# Patient Record
Sex: MALE | Race: WHITE | NOT HISPANIC OR LATINO | Employment: OTHER | URBAN - NONMETROPOLITAN AREA
[De-identification: names, ages, dates, MRNs, and addresses within clinical notes are randomized per-mention and may not be internally consistent; named-entity substitution may affect disease eponyms.]

---

## 2017-02-09 ENCOUNTER — OFFICE VISIT (OUTPATIENT)
Dept: CARDIOLOGY | Facility: CLINIC | Age: 82
End: 2017-02-09

## 2017-02-09 VITALS
SYSTOLIC BLOOD PRESSURE: 120 MMHG | HEIGHT: 70 IN | DIASTOLIC BLOOD PRESSURE: 64 MMHG | WEIGHT: 179 LBS | BODY MASS INDEX: 25.62 KG/M2 | HEART RATE: 60 BPM

## 2017-02-09 DIAGNOSIS — R06.02 SHORTNESS OF BREATH: ICD-10-CM

## 2017-02-09 DIAGNOSIS — R60.0 LOCALIZED EDEMA: ICD-10-CM

## 2017-02-09 DIAGNOSIS — I35.0 NONRHEUMATIC AORTIC VALVE STENOSIS: ICD-10-CM

## 2017-02-09 DIAGNOSIS — I49.5 SSS (SICK SINUS SYNDROME) (HCC): Primary | ICD-10-CM

## 2017-02-09 DIAGNOSIS — I10 ESSENTIAL HYPERTENSION: ICD-10-CM

## 2017-02-09 DIAGNOSIS — R53.83 OTHER FATIGUE: ICD-10-CM

## 2017-02-09 PROBLEM — G20 PARKINSON'S DISEASE (HCC): Status: ACTIVE | Noted: 2017-02-09

## 2017-02-09 PROBLEM — F03.90 DEMENTIA (HCC): Status: ACTIVE | Noted: 2017-02-09

## 2017-02-09 PROCEDURE — 99214 OFFICE O/P EST MOD 30 MIN: CPT | Performed by: NURSE PRACTITIONER

## 2017-02-09 PROCEDURE — 93000 ELECTROCARDIOGRAM COMPLETE: CPT | Performed by: NURSE PRACTITIONER

## 2017-02-09 NOTE — PROGRESS NOTES
Chief Complaint   Patient presents with   • Follow-up     Last pacemaker check 10/19/16. He has been having chest discomfort that he relates to indigestion.   • Dizziness     He states comes and goes. He states legs are weak.   • Shortness of Breath     He states this is new for him. PCP writes refills on medication. Last labs about 7 months ago.       Cardiac Complaints  dyspnea, Dizziness and fatigue      Subjective   Parth Tanner is a 84 y.o. male with a history of sick sinus syndrome diagnosed in 2011 by Dr. Monsivais and had a pacemaker placed. He also had a stress test and echocardiogram gram done the same year and reported as normal. In 2015 pacemaker interrogation showed short runs of ventricular tachycardia for which Lopressor was added.  Most recent pacer check in October of 2016 showed 99% ventricular pacing and less than 1% mode switch.  He returns today for follow up and reports shortness of breath with exertion and gets very winded with any activity.  He also reports dizziness which comes and goes and fatigue associated with it. He also reports some leg weakness and cold extremities, he says Dr. Blancas will be doing work up. Patient reports that PCP manages all labs and refills.        Cardiac History  Past Surgical History   Procedure Laterality Date   • Appendectomy     • Tonsillectomy     • Hand surgery Left    • Prostate surgery  08/2014     Prostate surgery-s/p recently dx with CA (to weak for surgery)-Dr.Ruby moya and .   • Pacemaker implantation  11/10/2011     St.Koko PPM placed by    • Cardiovascular stress test  12/09/2011     L.Myoview-() Normal   • Echo - converted  12/14/2011     Echo-() EF>65%   • Carotid artery - subclavian artery bypass graft  07/11/2014     Carotid US-() Normal   • Echo - converted  07/22/2015     Echo-EF 60%, AR 3.9, aortic sclerosis with minimal stenosis       Current Outpatient Prescriptions   Medication Sig Dispense Refill    • aspirin 81 MG EC tablet Take 81 mg by mouth daily.     • Cyanocobalamin (VITAMIN B-12) 1000 MCG/15ML liquid Take  by mouth every 30 (thirty) days.     • donepezil (ARICEPT) 10 MG tablet Take 10 mg by mouth every night.     • furosemide (LASIX) 20 MG tablet Take 20 mg by mouth daily as needed.     • lithium 300 MG tablet Take 300 mg by mouth daily.     • metoprolol tartrate (LOPRESSOR) 25 MG tablet TAKE ONE TABLET BY MOUTH TWICE A DAY 60 tablet 3   • omeprazole (PriLOSEC) 20 MG capsule Take 20 mg by mouth daily.     • oxybutynin (DITROPAN) 5 MG tablet Take 5 mg by mouth 2 (Two) Times a Day.     • pramipexole (MIRAPEX) 0.25 MG tablet Take 0.25 mg by mouth 2 (two) times a day.       No current facility-administered medications for this visit.        Rocephin [ceftriaxone]    Past Medical History   Diagnosis Date   • Dementia    • Edema      in lower extremities-bilateral   • History of prostate surgery      in Aug 2014- s/p recently dx with CA (to weak for surgery)- Dr. Haider following and Dr. Lemons.   • History of tonsillectomy    • Hx of appendectomy    • Hydrocele      DX- to have surgery in Oct 2014 regarding ()   • Hypertension      Well controlled by    • Parkinson disease    • SSS (sick sinus syndrome)      PM implant       Social History     Social History   • Marital status:      Spouse name: N/A   • Number of children: N/A   • Years of education: N/A     Occupational History   • Not on file.     Social History Main Topics   • Smoking status: Never Smoker   • Smokeless tobacco: Current User     Types: Chew      Comment: 2/2/2016   • Alcohol use No   • Drug use: No   • Sexual activity: Not on file     Other Topics Concern   • Not on file     Social History Narrative       Family History   Problem Relation Age of Onset   • Other Daughter      one daughter has MVP- and has had MI- at age thirty-three from medication.       Review of Systems   Constitutional: Positive for fatigue.  "  HENT: Negative.    Respiratory: Positive for shortness of breath.    Gastrointestinal: Negative.    Musculoskeletal: Negative.    Skin: Negative.    Neurological: Positive for dizziness.   Psychiatric/Behavioral: Negative.        DiabetesNo  Thyroidnormal    Objective     Visit Vitals   • /64 (BP Location: Left arm)   • Pulse 60   • Ht 70\" (177.8 cm)   • Wt 179 lb (81.2 kg)   • BMI 25.68 kg/m2       Physical Exam   Constitutional: He is oriented to person, place, and time. He appears well-developed and well-nourished.   HENT:   Head: Normocephalic and atraumatic.   Eyes: EOM are normal. Pupils are equal, round, and reactive to light.   Neck: Normal range of motion. Neck supple.   Cardiovascular: Normal rate and regular rhythm.    Murmur heard.  Pulmonary/Chest: Effort normal and breath sounds normal.   Abdominal: Soft.   Musculoskeletal: Normal range of motion.   Neurological: He is alert and oriented to person, place, and time.   Skin: Skin is warm and dry.   Psychiatric: He has a normal mood and affect.         ECG 12 Lead  Date/Time: 2/9/2017 9:19 AM  Performed by: DAWSON SCHAFFER  Authorized by: DAWSON SCHAFFER   Rhythm: paced  BPM: 61  Clinical impression: abnormal ECG            Assessment/Plan     HR and BP are stable today.  No changes in meds will be made.  EKG done today for SSS showed AV pacing.  Pacer check will be scheduled for April.  Echo advised to look for any worsening aortic stenosis or LV dysunction that may be attributing to his shortness of breath/fatigue.  More recommendations to follow.  Wife reports they are currently considering [place] with Dr. Blancas since patient reports leg fatigue and cold extremities, as of now, he is not wanting to have it done.  Labs he reports with you, could we get next copy?  No refills needed.  Good cardiac diet with limited sodium intake advised.  6 month follow up scheduled or sooner if needed.      Problems Addressed this Visit        Cardiovascular and " Mediastinum    SSS (sick sinus syndrome) - Primary    Relevant Orders    ECG 12 Lead    HTN (hypertension)      Other Visit Diagnoses     Localized edema        Other fatigue        Shortness of breath        Relevant Orders    Adult Transthoracic Echo Complete    Nonrheumatic aortic valve stenosis                      Electronically signed by ACACIA Shankar February 9, 2017 9:29 AM

## 2017-02-15 ENCOUNTER — OUTSIDE FACILITY SERVICE (OUTPATIENT)
Dept: CARDIOLOGY | Facility: CLINIC | Age: 82
End: 2017-02-15

## 2017-02-15 ENCOUNTER — HOSPITAL ENCOUNTER (OUTPATIENT)
Dept: CARDIOLOGY | Facility: HOSPITAL | Age: 82
Discharge: HOME | End: 2017-02-15
Admitting: NURSE PRACTITIONER

## 2017-02-15 PROCEDURE — 93306 TTE W/DOPPLER COMPLETE: CPT

## 2017-02-15 PROCEDURE — 93306 TTE W/DOPPLER COMPLETE: CPT | Performed by: INTERNAL MEDICINE

## 2017-03-15 ENCOUNTER — OFFICE VISIT (OUTPATIENT)
Dept: CARDIOLOGY | Facility: CLINIC | Age: 82
End: 2017-03-15

## 2017-03-15 DIAGNOSIS — I49.5 SSS (SICK SINUS SYNDROME) (HCC): Primary | ICD-10-CM

## 2017-03-15 DIAGNOSIS — I10 ESSENTIAL HYPERTENSION: ICD-10-CM

## 2017-03-15 PROCEDURE — 93288 INTERROG EVL PM/LDLS PM IP: CPT | Performed by: INTERNAL MEDICINE

## 2017-08-14 ENCOUNTER — OFFICE VISIT (OUTPATIENT)
Dept: CARDIOLOGY | Facility: CLINIC | Age: 82
End: 2017-08-14

## 2017-08-14 VITALS
BODY MASS INDEX: 24.77 KG/M2 | HEART RATE: 60 BPM | DIASTOLIC BLOOD PRESSURE: 60 MMHG | HEIGHT: 70 IN | SYSTOLIC BLOOD PRESSURE: 102 MMHG | WEIGHT: 173 LBS

## 2017-08-14 DIAGNOSIS — I49.5 SSS (SICK SINUS SYNDROME) (HCC): Primary | ICD-10-CM

## 2017-08-14 DIAGNOSIS — I10 ESSENTIAL HYPERTENSION: ICD-10-CM

## 2017-08-14 DIAGNOSIS — F03.90 DEMENTIA WITHOUT BEHAVIORAL DISTURBANCE, UNSPECIFIED DEMENTIA TYPE: ICD-10-CM

## 2017-08-14 DIAGNOSIS — G20 PARKINSON'S DISEASE (HCC): ICD-10-CM

## 2017-08-14 DIAGNOSIS — Z78.9 PACED RHYTHM ON ELECTROCARDIOGRAM (ECG): ICD-10-CM

## 2017-08-14 DIAGNOSIS — Z72.0 CHEWING TOBACCO USE: ICD-10-CM

## 2017-08-14 PROCEDURE — 93000 ELECTROCARDIOGRAM COMPLETE: CPT | Performed by: NURSE PRACTITIONER

## 2017-08-14 PROCEDURE — 99213 OFFICE O/P EST LOW 20 MIN: CPT | Performed by: NURSE PRACTITIONER

## 2017-09-06 ENCOUNTER — OFFICE VISIT (OUTPATIENT)
Dept: CARDIOLOGY | Facility: CLINIC | Age: 82
End: 2017-09-06

## 2017-09-06 DIAGNOSIS — I49.5 SSS (SICK SINUS SYNDROME) (HCC): Primary | ICD-10-CM

## 2017-09-06 PROCEDURE — 93288 INTERROG EVL PM/LDLS PM IP: CPT | Performed by: INTERNAL MEDICINE

## 2018-02-19 ENCOUNTER — OFFICE VISIT (OUTPATIENT)
Dept: CARDIOLOGY | Facility: CLINIC | Age: 83
End: 2018-02-19

## 2018-02-19 VITALS
BODY MASS INDEX: 25.2 KG/M2 | HEIGHT: 70 IN | SYSTOLIC BLOOD PRESSURE: 140 MMHG | WEIGHT: 176 LBS | HEART RATE: 60 BPM | DIASTOLIC BLOOD PRESSURE: 90 MMHG

## 2018-02-19 DIAGNOSIS — R51.9 SLEEP RELATED HEADACHES: ICD-10-CM

## 2018-02-19 DIAGNOSIS — I10 ESSENTIAL HYPERTENSION: ICD-10-CM

## 2018-02-19 DIAGNOSIS — I49.5 SSS (SICK SINUS SYNDROME) (HCC): Primary | ICD-10-CM

## 2018-02-19 DIAGNOSIS — M54.2 NECK PAIN: ICD-10-CM

## 2018-02-19 DIAGNOSIS — F03.90 DEMENTIA WITHOUT BEHAVIORAL DISTURBANCE, UNSPECIFIED DEMENTIA TYPE: ICD-10-CM

## 2018-02-19 DIAGNOSIS — G20 PARKINSON'S DISEASE (HCC): ICD-10-CM

## 2018-02-19 PROCEDURE — 99213 OFFICE O/P EST LOW 20 MIN: CPT | Performed by: NURSE PRACTITIONER

## 2018-02-19 PROCEDURE — 93000 ELECTROCARDIOGRAM COMPLETE: CPT | Performed by: NURSE PRACTITIONER

## 2018-02-19 NOTE — PROGRESS NOTES
Chief Complaint   Patient presents with   • Follow-up     For cardiac management. Reports he occasionally has chest pains. Reports he gets shortness of breath with exertion. Reports he has headaches and neck pains. Last lab work was done about 3 months ago per PCP, not in chart.    • Med Refill     Needs refills on cardiac medications. 90 day supplys to [facility].   • Pacemaker Check     Last SJM PPM checked on 09/06/17       Cardiac Complaints  chest pressure/discomfort and dyspnea      Subjective   Parth Tanner is a 85 y.o. male with HTN, Parkinson's, dementia, and sick sinus syndrome diagnosed in 2011 by Dr. Monsivais and had a pacemaker placed. He also had a stress test and echocardiogram done the same year and reported as normal. In 2015 pacemaker interrogation showed short runs of ventricular tachycardia for which Lopressor was added.  Most recent pacer check in March 2017 showed 99% ventricular pacing and no mode switches. Due to shortness of breath a repeat echo was done in February 2017 that showed normal LV ejection fraction and mild PHT. He continues to be managed medically. He returns today for follow up and expresses no new concerns.  He does continue to have some rare chest pains and some shortness of breath with exertion, but this has been the same for sometime.  His wife states that this is only if he really over does it.  He does also have some pain in his neck which he attributes to his arthritis. He does have a great deal of headaches, but this has been going on for years, his wife attributes to his neck and his lack of sleep at night, as she reports him as pacing.  He gets medication for this from Dr. Irving. Labs are done with PCP, his wife reports they were checked about 3 months ago, no copy is available for review.  Refills of cardiac meds requested for 90 day supply.  Most recent pacer checked in September showed ventricular pacing at 99% and atrial sensing of 30% with no AMS  with 6 years of battery life remaining.      Cardiac History  Past Surgical History:   Procedure Laterality Date   • APPENDECTOMY     • CARDIOVASCULAR STRESS TEST  12/09/2011    L.Myoview-() Normal   • CAROTID ARTERY - SUBCLAVIAN ARTERY BYPASS GRAFT  07/11/2014    Carotid US-() Normal   • ECHO - CONVERTED  12/14/2011    Echo-() EF>65%   • ECHO - CONVERTED  07/22/2015    Echo-EF 60%, AR 3.9, aortic sclerosis with minimal stenosis   • ECHO - CONVERTED  02/15/2017    EF 60-65%, mild MR, mild PHT   • HAND SURGERY Left    • PACEMAKER IMPLANTATION  11/10/2011    St.Koko PPM placed by    • PROSTATE SURGERY  08/2014    Prostate surgery-s/p recently dx with CA (to weak for surgery)-Dr.Ruby moya and .   • TONSILLECTOMY         Current Outpatient Prescriptions   Medication Sig Dispense Refill   • aspirin 81 MG EC tablet Take 81 mg by mouth daily.     • donepezil (ARICEPT) 10 MG tablet Take 10 mg by mouth every night.     • furosemide (LASIX) 20 MG tablet Take 20 mg by mouth daily as needed.     • lithium 300 MG tablet Take 300 mg by mouth daily.     • metoprolol tartrate (LOPRESSOR) 25 MG tablet Take 1 tablet by mouth Every 12 (Twelve) Hours. 180 tablet 2   • omeprazole (PriLOSEC) 20 MG capsule Take 20 mg by mouth daily.     • oxybutynin (DITROPAN) 5 MG tablet Take 5 mg by mouth 2 (Two) Times a Day.     • pramipexole (MIRAPEX) 0.25 MG tablet Take 0.25 mg by mouth 2 (two) times a day.       No current facility-administered medications for this visit.        Rocephin [ceftriaxone]    Past Medical History:   Diagnosis Date   • Dementia    • Edema     in lower extremities-bilateral   • History of prostate surgery     in Aug 2014- s/p recently dx with CA (to weak for surgery)- Dr. Meliza moya and Dr. Lemons.   • History of tonsillectomy    • Hx of appendectomy    • Hydrocele     DX- to have surgery in Oct 2014 regarding ()   • Hypertension     Well controlled by    •  "Parkinson disease    • SSS (sick sinus syndrome)     PM implant       Social History     Social History   • Marital status:      Spouse name: N/A   • Number of children: N/A   • Years of education: N/A     Occupational History   • Not on file.     Social History Main Topics   • Smoking status: Never Smoker   • Smokeless tobacco: Current User     Types: Chew      Comment: 2/2/2016   • Alcohol use No   • Drug use: No   • Sexual activity: Not on file     Other Topics Concern   • Not on file     Social History Narrative       Family History   Problem Relation Age of Onset   • Other Daughter      one daughter has MVP- and has had MI- at age thirty-three from medication.       Review of Systems   Constitution: Negative for weakness and malaise/fatigue.   Cardiovascular: Positive for chest pain and dyspnea on exertion. Negative for claudication, leg swelling, near-syncope, palpitations and syncope.   Respiratory: Positive for shortness of breath. Negative for wheezing.    Musculoskeletal: Positive for arthritis, joint pain and neck pain.   Gastrointestinal: Negative for anorexia, heartburn and nausea.   Genitourinary: Negative for dysuria, hesitancy and nocturia.   Neurological: Positive for headaches. Negative for dizziness, light-headedness and numbness.   Psychiatric/Behavioral: Positive for memory loss. Negative for depression. The patient is not nervous/anxious.        DiabetesNo  Thyroidnormal    Objective     /90 (BP Location: Left arm)  Pulse 60  Ht 177.8 cm (70\")  Wt 79.8 kg (176 lb)  BMI 25.25 kg/m2    Physical Exam   Constitutional: He is oriented to person, place, and time. He appears well-developed and well-nourished.   HENT:   Head: Normocephalic and atraumatic.   Eyes: EOM are normal. Pupils are equal, round, and reactive to light.   Neck: Normal range of motion. Neck supple.   Cardiovascular: Normal rate and regular rhythm.    Murmur heard.  Pulmonary/Chest: Effort normal and breath sounds " normal.   Abdominal: Soft.   Musculoskeletal: Normal range of motion.   Neurological: He is alert and oriented to person, place, and time.   Skin: Skin is warm and dry.   Psychiatric: He has a normal mood and affect. His behavior is normal.         ECG 12 Lead  Date/Time: 2/19/2018 8:27 AM  Performed by: DAWSON SCHAFFER  Authorized by: DAWSON SCHAFFER   Rhythm: paced  BPM: 60  Clinical impression: abnormal ECG            Assessment/Plan     HR and BP are both stable.  BP is high side of normal, but wife attributes to his neck pain.  She reports she will be in contact with your office for tramadol refills, as she reports he takes this for neck pain and headaches. EKG done today for pacemaker shows AV pacing.  Repeat St Koko pacer check advised.  No new cardiac workup will be advised today as cardiac status appears stable and no new concerns are voiced. If symptoms should worsen, wife and patient advised to call for further recommendations.  Labs are done with your office, most recent were done about 3 months ago, could we have a copy for our records?  Cardiac refills sent per request.  Good cardiac diet and activity as tolerated advised. BMI stable at 25.  6 month follow up advised or sooner if needed.    Patient's BMI is within normal parameters. No follow-up required.    Problems Addressed this Visit        Cardiovascular and Mediastinum    SSS (sick sinus syndrome) - Primary    Relevant Medications    metoprolol tartrate (LOPRESSOR) 25 MG tablet    Other Relevant Orders    ECG 12 Lead    HTN (hypertension)    Relevant Medications    metoprolol tartrate (LOPRESSOR) 25 MG tablet       Nervous and Auditory    Dementia    Parkinson's disease      Other Visit Diagnoses     Neck pain        Sleep related headaches                      Electronically signed by ACACIA Shankar February 19, 2018 9:47 AM

## 2018-03-07 ENCOUNTER — OFFICE VISIT (OUTPATIENT)
Dept: CARDIOLOGY | Facility: CLINIC | Age: 83
End: 2018-03-07

## 2018-03-07 DIAGNOSIS — I49.5 SSS (SICK SINUS SYNDROME) (HCC): Primary | ICD-10-CM

## 2018-03-07 PROCEDURE — 93288 INTERROG EVL PM/LDLS PM IP: CPT | Performed by: INTERNAL MEDICINE

## 2018-08-17 NOTE — PROGRESS NOTES
Chief Complaint   Patient presents with   • Follow-up     For cardiac management. Patient is on aspirin. Reports that some times he has chest pains, but says he does have a lot of acid reflux. Reports he does occasionally get short of breath if he is walking up a hill. Reports that he does not sleep well at night, but can sit down through the day and fall asleep. Last lab work was done sometime this year per PCP, not in chart. Reports that he has been having swelling in his feet and legs, and having leg pain.   • Pacemaker Check     Last SJM PPM check was on 03/07/18 per our office.    • Med Refill     Needs refills on metoprolol. 90 day supplys to [facility].        Cardiac Complaints  chest pressure/discomfort and dyspnea      Subjective   Parth Tanner is a 86 y.o. male with HTN, Parkinson's, dementia, and sick sinus syndrome diagnosed in 2011 by Dr. Monsivais and had a pacemaker placed. He also had a stress test and echocardiogram done the same year and reported as normal. In 2015 pacemaker interrogation showed short runs of ventricular tachycardia for which Lopressor was added. Most recent pacer check in March 2017 showed 99% ventricular pacing and no mode switches. Due to shortness of breath a repeat echo was done in February 2017 that showed normal LV ejection fraction and mild PHT. Most recent pacer check in March of 2018 showed 6.5 years of battery life remaining, it does show about 46% of atrial pacing and 99% ventricular pacing with AMS x4 with less than 2.5 years remaining. Patient returns today for follow up accompanied by his wife who does much of his history as patient suffers from memory loss.  Patient has admitted to his wife that he does have chest pain but she notes it is often after he eats things that will given him indigestion.  Patient does admit to occasionally getting short of breath but only if he over does it or walks up a big hill.  Wife states with everyday activity around  the home he has done well.  He does not sleep well at night and his wife states that Dr. Irving has suggested OTC meds but it has not helped.  Dr. Irving can not give prescription for sleeping aid as he does not feel it is safe.  Labs and refills done with PCP, no copies are available for review.  Refills of metoprolol requested.        Cardiac History  Past Surgical History:   Procedure Laterality Date   • APPENDECTOMY     • CARDIOVASCULAR STRESS TEST  12/09/2011    L.Myoview-() Normal   • CAROTID ARTERY - SUBCLAVIAN ARTERY BYPASS GRAFT  07/11/2014    Carotid US-() Normal   • ECHO - CONVERTED  12/14/2011    Echo-() EF>65%   • ECHO - CONVERTED  07/22/2015    Echo-EF 60%, AR 3.9, aortic sclerosis with minimal stenosis   • ECHO - CONVERTED  02/15/2017    EF 60-65%, mild MR, mild PHT   • HAND SURGERY Left    • PACEMAKER IMPLANTATION  11/10/2011    St.Koko PPM placed by    • PROSTATE SURGERY  08/2014    Prostate surgery-s/p recently dx with CA (to weak for surgery)-Dr.Ruby moya and .   • TONSILLECTOMY         Current Outpatient Prescriptions   Medication Sig Dispense Refill   • aspirin 81 MG EC tablet Take 81 mg by mouth daily.     • donepezil (ARICEPT) 10 MG tablet Take 10 mg by mouth every night.     • furosemide (LASIX) 20 MG tablet Take 20 mg by mouth As Needed.     • lithium 300 MG tablet Take 300 mg by mouth daily.     • metoprolol tartrate (LOPRESSOR) 25 MG tablet Take 1 tablet by mouth Every 12 (Twelve) Hours. 180 tablet 2   • omeprazole (PriLOSEC) 20 MG capsule Take 20 mg by mouth daily.     • oxybutynin (DITROPAN) 5 MG tablet Take 5 mg by mouth 2 (Two) Times a Day.     • pramipexole (MIRAPEX) 0.25 MG tablet Take 0.25 mg by mouth 2 (two) times a day.       No current facility-administered medications for this visit.        Rocephin [ceftriaxone]    Past Medical History:   Diagnosis Date   • Dementia    • Edema     in lower extremities-bilateral   • History of prostate  "surgery     in Aug 2014- s/p recently dx with CA (to weak for surgery)- Dr. Haider following and Dr. Lemons.   • History of tonsillectomy    • Hx of appendectomy    • Hydrocele     DX- to have surgery in Oct 2014 regarding ()   • Hypertension     Well controlled by    • Parkinson disease (CMS/HCC)    • SSS (sick sinus syndrome) (CMS/[facility])     PM implant       Social History     Social History   • Marital status:      Spouse name: N/A   • Number of children: N/A   • Years of education: N/A     Occupational History   • Not on file.     Social History Main Topics   • Smoking status: Never Smoker   • Smokeless tobacco: Current User     Types: Chew      Comment: 2/2/2016   • Alcohol use No   • Drug use: No   • Sexual activity: Not on file     Other Topics Concern   • Not on file     Social History Narrative   • No narrative on file       Family History   Problem Relation Age of Onset   • Other Daughter         one daughter has MVP- and has had MI- at age thirty-three from medication.       Review of Systems   Constitution: Negative for weakness and malaise/fatigue.   Cardiovascular: Positive for chest pain. Negative for dyspnea on exertion, irregular heartbeat, orthopnea and syncope.   Respiratory: Positive for shortness of breath. Negative for cough and wheezing.    Musculoskeletal: Negative for back pain and joint pain.   Gastrointestinal: Negative for anorexia, heartburn, nausea and vomiting.   Genitourinary: Negative for dysuria, hematuria, hesitancy and nocturia.   Neurological: Negative for dizziness, light-headedness and loss of balance.   Psychiatric/Behavioral: Negative for depression and memory loss. The patient is not nervous/anxious.            Objective     /82 (BP Location: Left arm)   Pulse 60   Ht 177.8 cm (70\")   Wt 72.6 kg (160 lb)   BMI 22.96 kg/m²     Physical Exam   Constitutional: He is oriented to person, place, and time. He appears well-developed and " "well-nourished.   HENT:   Head: Normocephalic and atraumatic.   Eyes: Pupils are equal, round, and reactive to light. EOM are normal.   Neck: Normal range of motion. Neck supple.   Cardiovascular: Normal rate and regular rhythm.    Murmur heard.  Pulmonary/Chest: Effort normal and breath sounds normal.   Abdominal: Soft.   Musculoskeletal: Normal range of motion.   Neurological: He is alert and oriented to person, place, and time.   Skin: Skin is warm and dry.   Psychiatric: He has a normal mood and affect. His behavior is normal.         ECG 12 Lead  Date/Time: 8/20/2018 8:45 AM  Performed by: DAWSON SCHAFFER  Authorized by: DAWSON SCHAFFER   Rhythm: paced  BPM: 60  Clinical impression: abnormal ECG            Assessment/Plan     HTN is well managed today, no adjustment to current regimen recommended.  HR is stable at 60.  EKG done today for SSS and PPM shows an AV paced rhythm.  Current lopressor dosing advised.  Repeat St. Koko pacer check will be advised. Discussion with his wife and him about repeat cardiac workup since cardiac symptoms continue but patient and wife decline at this time.  Patient states,\"if it ain't broke don't fix it.\"  If cardiac symptoms should worsen, patient and wife advised to call for further recommendations.  Weight has declined by about 16 pounds since prior visit and BMI is now 22.96.  Discussed with his wife supplementing his diet with higher protein supplements like boost or ensure.  For edema in BLE, patient encouraged to limit sodium intake and continue with lasix daily.  Labs and most refills with your office, could we have most recent labs for our review?  Refills of lopressor sent per request. 6 month follow up scheduled or sooner if needed.          Problems Addressed this Visit        Cardiovascular and Mediastinum    SSS (sick sinus syndrome) (CMS/HCC) - Primary    Relevant Medications    metoprolol tartrate (LOPRESSOR) 25 MG tablet    Other Relevant Orders    ECG 12 Lead "    HTN (hypertension)    Relevant Medications    furosemide (LASIX) 20 MG tablet    metoprolol tartrate (LOPRESSOR) 25 MG tablet       Nervous and Auditory    Dementia    Parkinson's disease (CMS/HCC)       Other    Chewing tobacco use      Other Visit Diagnoses     Other chest pain        Shortness of breath        Weight loss              Patient's Body mass index is 22.96 kg/m². BMI is within normal parameters. No follow-up required.            Electronically signed by ACACIA Shankar August 20, 2018 4:41 PM

## 2018-08-20 ENCOUNTER — OFFICE VISIT (OUTPATIENT)
Dept: CARDIOLOGY | Facility: CLINIC | Age: 83
End: 2018-08-20

## 2018-08-20 VITALS
HEIGHT: 70 IN | HEART RATE: 60 BPM | SYSTOLIC BLOOD PRESSURE: 132 MMHG | DIASTOLIC BLOOD PRESSURE: 82 MMHG | WEIGHT: 160 LBS | BODY MASS INDEX: 22.9 KG/M2

## 2018-08-20 DIAGNOSIS — G20 PARKINSON'S DISEASE (HCC): ICD-10-CM

## 2018-08-20 DIAGNOSIS — I10 ESSENTIAL HYPERTENSION: ICD-10-CM

## 2018-08-20 DIAGNOSIS — F03.90 DEMENTIA WITHOUT BEHAVIORAL DISTURBANCE, UNSPECIFIED DEMENTIA TYPE: ICD-10-CM

## 2018-08-20 DIAGNOSIS — R63.4 WEIGHT LOSS: ICD-10-CM

## 2018-08-20 DIAGNOSIS — Z72.0 CHEWING TOBACCO USE: ICD-10-CM

## 2018-08-20 DIAGNOSIS — I49.5 SSS (SICK SINUS SYNDROME) (HCC): Primary | ICD-10-CM

## 2018-08-20 DIAGNOSIS — R06.02 SHORTNESS OF BREATH: ICD-10-CM

## 2018-08-20 DIAGNOSIS — R07.89 OTHER CHEST PAIN: ICD-10-CM

## 2018-08-20 PROCEDURE — 99213 OFFICE O/P EST LOW 20 MIN: CPT | Performed by: NURSE PRACTITIONER

## 2018-08-20 PROCEDURE — 93000 ELECTROCARDIOGRAM COMPLETE: CPT | Performed by: NURSE PRACTITIONER

## 2018-08-20 RX ORDER — FUROSEMIDE 20 MG/1
20 TABLET ORAL AS NEEDED
COMMUNITY
End: 2020-02-19 | Stop reason: ALTCHOICE

## 2018-09-05 ENCOUNTER — OFFICE VISIT (OUTPATIENT)
Dept: CARDIOLOGY | Facility: CLINIC | Age: 83
End: 2018-09-05

## 2018-09-05 DIAGNOSIS — I49.5 SSS (SICK SINUS SYNDROME) (HCC): Primary | ICD-10-CM

## 2018-09-05 PROCEDURE — 93288 INTERROG EVL PM/LDLS PM IP: CPT | Performed by: INTERNAL MEDICINE

## 2018-12-17 ENCOUNTER — OUTSIDE FACILITY SERVICE (OUTPATIENT)
Dept: CARDIOLOGY | Facility: CLINIC | Age: 83
End: 2018-12-17

## 2018-12-17 PROCEDURE — 93306 TTE W/DOPPLER COMPLETE: CPT | Performed by: INTERNAL MEDICINE

## 2018-12-17 PROCEDURE — 99223 1ST HOSP IP/OBS HIGH 75: CPT | Performed by: INTERNAL MEDICINE

## 2018-12-18 ENCOUNTER — OUTSIDE FACILITY SERVICE (OUTPATIENT)
Dept: CARDIOLOGY | Facility: CLINIC | Age: 83
End: 2018-12-18

## 2018-12-18 PROCEDURE — 93018 CV STRESS TEST I&R ONLY: CPT | Performed by: INTERNAL MEDICINE

## 2018-12-18 PROCEDURE — 99232 SBSQ HOSP IP/OBS MODERATE 35: CPT | Performed by: INTERNAL MEDICINE

## 2018-12-19 ENCOUNTER — OUTSIDE FACILITY SERVICE (OUTPATIENT)
Dept: CARDIOLOGY | Facility: CLINIC | Age: 83
End: 2018-12-19

## 2018-12-19 PROCEDURE — 99232 SBSQ HOSP IP/OBS MODERATE 35: CPT | Performed by: INTERNAL MEDICINE

## 2019-05-28 ENCOUNTER — TELEPHONE (OUTPATIENT)
Dept: CARDIOLOGY | Facility: CLINIC | Age: 84
End: 2019-05-28

## 2019-08-29 ENCOUNTER — OFFICE VISIT (OUTPATIENT)
Dept: CARDIOLOGY | Facility: CLINIC | Age: 84
End: 2019-08-29

## 2019-08-29 VITALS
HEIGHT: 70 IN | HEART RATE: 62 BPM | DIASTOLIC BLOOD PRESSURE: 62 MMHG | BODY MASS INDEX: 23.95 KG/M2 | WEIGHT: 167.3 LBS | SYSTOLIC BLOOD PRESSURE: 100 MMHG

## 2019-08-29 DIAGNOSIS — F03.90 DEMENTIA WITHOUT BEHAVIORAL DISTURBANCE, UNSPECIFIED DEMENTIA TYPE: ICD-10-CM

## 2019-08-29 DIAGNOSIS — I10 ESSENTIAL HYPERTENSION: ICD-10-CM

## 2019-08-29 DIAGNOSIS — I49.5 SSS (SICK SINUS SYNDROME) (HCC): Primary | ICD-10-CM

## 2019-08-29 DIAGNOSIS — Z72.0 CHEWING TOBACCO USE: ICD-10-CM

## 2019-08-29 DIAGNOSIS — G20 PARKINSON'S DISEASE (HCC): ICD-10-CM

## 2019-08-29 PROCEDURE — 99213 OFFICE O/P EST LOW 20 MIN: CPT | Performed by: NURSE PRACTITIONER

## 2019-08-29 PROCEDURE — 93280 PM DEVICE PROGR EVAL DUAL: CPT | Performed by: INTERNAL MEDICINE

## 2019-08-29 RX ORDER — LOSARTAN POTASSIUM 25 MG/1
25 TABLET ORAL DAILY
COMMUNITY

## 2019-08-29 RX ORDER — FUROSEMIDE 20 MG/1
20 TABLET ORAL 2 TIMES DAILY
COMMUNITY

## 2019-08-29 RX ORDER — SIMETHICONE 80 MG
80 TABLET,CHEWABLE ORAL EVERY 6 HOURS PRN
COMMUNITY

## 2019-08-29 RX ORDER — LITHIUM CARBONATE 300 MG/1
300 CAPSULE ORAL 2 TIMES DAILY WITH MEALS
COMMUNITY

## 2019-08-29 NOTE — PROGRESS NOTES
Chief Complaint   Patient presents with   • Hospital follow up     Had hospital stay  with Pneumonia January 2019 . No cardiac complaints today   • Pacemaker Check     SJM PPM check today   • Med Refill     Refills of meds with nursing home       Cardiac Complaints  none      Subjective   Parth Tanner is a 87 y.o. male with HTN, Parkinson's, dementia, edema, and sick sinus syndrome diagnosed in 2011 by Dr. Monsivais and had a pacemaker placed. He also had a stress test and echocardiogram done the same year and reported as normal. In 2015 pacemaker interrogation showed short runs of ventricular tachycardia for which Lopressor was added.  Due to shortness of breath a repeat echo was done in February 2017 that showed normal LV ejection fraction and mild PHT. It appears he went to the hospital in December 2018 for chest pain.  Stress and echo were advised that showed EF of 46% and no ischemic burden without significant valvular concerns. Patient was diagnosed with pneumonia and treated with ABX therapy for right sided community acquired pneumonia.  Most recent pacer check from 8/29/2019 showed normal function with 69% RA pacing and 99% RV pacing and 2-3 years of battery life remaining with less than 1% AMS.    Patient returns today for follow up and reports doing well. Caregiver from the nursing home who accompanies him states the patient has done well. Patient denies any shortness of breath, chest pain, palpitations, dizziness, or falls. Labs and refills remain monitored by the nursing home. Unfortunately, he is still chewing tobacco despite concerns.         Cardiac History  Past Surgical History:   Procedure Laterality Date   • APPENDECTOMY     • CARDIOVASCULAR STRESS TEST  12/09/2011    L.Myoview-() Normal   • CARDIOVASCULAR STRESS TEST  12/17/2018    @[facility]:  EF 46%, no ischemia   • CAROTID ARTERY - SUBCLAVIAN ARTERY BYPASS GRAFT  07/11/2014    Carotid US-() Normal   • ECHO - CONVERTED  12/14/2011     Echo-() EF>65%   • ECHO - CONVERTED  07/22/2015    Echo-EF 60%, AR 3.9, aortic sclerosis with minimal stenosis   • ECHO - CONVERTED  02/15/2017    EF 60-65%, mild MR, mild PHT   • ECHO - CONVERTED  12/17/2018    EF 50-55%, mild MR, mod pulm HTN   • HAND SURGERY Left    • PACEMAKER IMPLANTATION  11/10/2011    [employer]Koko PPM placed by    • PROSTATE SURGERY  08/2014    Prostate surgery-s/p recently dx with CA (to weak for surgery)-Dr.Ruby moya and .   • TONSILLECTOMY         Current Outpatient Medications   Medication Sig Dispense Refill   • aspirin 81 MG EC tablet Take 81 mg by mouth daily.     • Cyanocobalamin (B-12) 1000 MCG/ML kit Inject 1 mL as directed Every 30 (Thirty) Days.     • donepezil (ARICEPT) 10 MG tablet Take 10 mg by mouth every night.     • furosemide (LASIX) 20 MG tablet Take 20 mg by mouth As Needed.     • furosemide (LASIX) 20 MG tablet Take 20 mg by mouth 2 (Two) Times a Day.     • lithium carbonate 300 MG capsule Take 300 mg by mouth 2 (Two) Times a Day With Meals.     • losartan (COZAAR) 25 MG tablet Take 25 mg by mouth Daily.     • metoprolol tartrate (LOPRESSOR) 25 MG tablet Take 1 tablet by mouth Every 12 (Twelve) Hours. 180 tablet 2   • omeprazole (PriLOSEC) 20 MG capsule Take 20 mg by mouth daily.     • oxybutynin (DITROPAN) 5 MG tablet Take 5 mg by mouth 2 (Two) Times a Day.     • pramipexole (MIRAPEX) 0.25 MG tablet Take 0.25 mg by mouth 2 (two) times a day.     • simethicone (MYLICON) 80 MG chewable tablet Chew 80 mg Every 6 (Six) Hours As Needed for Flatulence.     • lithium 300 MG tablet Take 300 mg by mouth daily.       No current facility-administered medications for this visit.        Rocephin [ceftriaxone]    Past Medical History:   Diagnosis Date   • Dementia    • Edema     in lower extremities-bilateral   • History of prostate surgery     in Aug 2014- s/p recently dx with CA (to weak for surgery)- Dr. Meliza moya and Dr. Lemons.   • History of  "tonsillectomy    • Hx of appendectomy    • Hydrocele     DX- to have surgery in Oct 2014 regarding ()   • Hypertension     Well controlled by    • Parkinson disease (CMS/[facility])    • SSS (sick sinus syndrome) (CMS/[facility])     PM implant       Social History     Socioeconomic History   • Marital status:      Spouse name: Not on file   • Number of children: Not on file   • Years of education: Not on file   • Highest education level: Not on file   Tobacco Use   • Smoking status: Never Smoker   • Smokeless tobacco: Current User     Types: Chew   • Tobacco comment: 2/2/2016   Substance and Sexual Activity   • Alcohol use: No   • Drug use: No       Family History   Problem Relation Age of Onset   • Other Daughter         one daughter has MVP- and has had MI- at age thirty-three from medication.       Review of Systems   Constitution: Negative for weakness and malaise/fatigue.   Cardiovascular: Negative for chest pain, claudication, dyspnea on exertion, irregular heartbeat, near-syncope, orthopnea, palpitations and syncope.   Respiratory: Negative for cough, shortness of breath and wheezing.    Musculoskeletal: Negative for back pain, joint pain and joint swelling.   Gastrointestinal: Negative for anorexia, heartburn, nausea and vomiting.   Genitourinary: Negative for dysuria, hematuria, hesitancy and nocturia.   Neurological: Negative for dizziness, light-headedness and loss of balance.   Psychiatric/Behavioral: Negative for depression and memory loss. The patient is not nervous/anxious.            Objective     /62 (BP Location: Left arm)   Pulse 62   Ht 177.8 cm (70\")   Wt 75.9 kg (167 lb 4.8 oz)   BMI 24.01 kg/m²     Physical Exam   Constitutional: He appears well-developed.   HENT:   Head: Normocephalic and atraumatic.   Eyes: EOM are normal. Pupils are equal, round, and reactive to light.   Neck: Normal range of motion. Neck supple.   Cardiovascular: Normal rate and regular rhythm. "   Murmur heard.  Pulmonary/Chest: Effort normal. He has wheezes.   Abdominal: Soft.   Musculoskeletal: Normal range of motion.   Neurological: He is alert.   Skin: Skin is warm and dry.   Psychiatric: He has a normal mood and affect. His behavior is normal.         ECG 12 Lead  Date/Time: 8/29/2019 12:22 PM  Performed by: Anne Kat APRN  Authorized by: Anne Kat APRN   Comparison: compared with previous ECG from 8/12/2018  Similar to previous ECG  Rhythm: paced  BPM: 62    Clinical impression: abnormal EKG            Assessment/Plan     HR is stable and regular.  EKG done today for SSS and PPM shows a paced rhythm similar to prior. Pacer check done today shows normal function and 2 to 3 years of battery life remaining. [employer] [facility]oko pacer check will be scheduled for once yearly check.  No repeat cardiac workup ordered at this time as both patient and caregiver deny concerns. If symptoms should develop, or they should see a change in the patient at the nursing home, they are to call.  HTN is well managed on current, no adjustment will be recommended. Memory loss appears to be getting worse as patient could not remember he also has Parkinson's disease and why he shakes. Caregiver does report adequate food intake and patient has recently gained weight. Continued cardiac diet encouraged. Unfortunately, he is still chewing tobacco and I do not believe he will be quitting as he can not understand either the benefits or the risks of continued use. BMI noted at 24.01, good cardiac diet encouraged. Yearly follow up will be advised as mental status continues to decline and due to logistics of appointment. We will try and have pacer checked on the same day.  No refills needed as both labs and refills managed by the nursing home. Caregiver urged once again to call with concerns.      Problems Addressed this Visit        Cardiovascular and Mediastinum    SSS (sick sinus syndrome) (CMS/HCC) - Primary    Relevant Orders     ECG 12 Lead    HTN (hypertension)    Relevant Medications    losartan (COZAAR) 25 MG tablet    furosemide (LASIX) 20 MG tablet       Nervous and Auditory    Dementia    Relevant Medications    lithium carbonate 300 MG capsule    Parkinson's disease (CMS/HCC)       Other    Chewing tobacco use          Patient's Body mass index is 24.01 kg/m². BMI is within normal parameters. No follow-up required.            Electronically signed by ACACIA Shankar August 30, 2019 10:40 AM

## 2020-02-19 ENCOUNTER — OFFICE VISIT (OUTPATIENT)
Dept: CARDIOLOGY | Facility: CLINIC | Age: 85
End: 2020-02-19

## 2020-02-19 VITALS
HEART RATE: 68 BPM | WEIGHT: 172 LBS | BODY MASS INDEX: 24.62 KG/M2 | HEIGHT: 70 IN | DIASTOLIC BLOOD PRESSURE: 58 MMHG | SYSTOLIC BLOOD PRESSURE: 98 MMHG

## 2020-02-19 DIAGNOSIS — Z95.0 PRESENCE OF CARDIAC PACEMAKER: ICD-10-CM

## 2020-02-19 DIAGNOSIS — I10 ESSENTIAL HYPERTENSION: ICD-10-CM

## 2020-02-19 DIAGNOSIS — I49.5 SSS (SICK SINUS SYNDROME) (HCC): Primary | ICD-10-CM

## 2020-02-19 DIAGNOSIS — G20 PARKINSON'S DISEASE (HCC): ICD-10-CM

## 2020-02-19 DIAGNOSIS — F03.90 DEMENTIA WITHOUT BEHAVIORAL DISTURBANCE, UNSPECIFIED DEMENTIA TYPE: ICD-10-CM

## 2020-02-19 PROCEDURE — 93280 PM DEVICE PROGR EVAL DUAL: CPT | Performed by: INTERNAL MEDICINE

## 2020-02-19 PROCEDURE — 99213 OFFICE O/P EST LOW 20 MIN: CPT | Performed by: NURSE PRACTITIONER

## 2020-02-19 RX ORDER — POLYETHYLENE GLYCOL 3350 17 G/17G
17 POWDER, FOR SOLUTION ORAL DAILY
COMMUNITY

## 2020-02-19 RX ORDER — ACETAMINOPHEN 500 MG
500 TABLET ORAL EVERY 4 HOURS PRN
COMMUNITY

## 2020-02-19 NOTE — PROGRESS NOTES
"Chief Complaint   Patient presents with   • Follow-up     For cardiac management. Patient is on aspirin. States that he has had some thumping feelings in his chest, but finally quit. States that he has had some shortness of breath, but states that O2 saturation stays up, and has oxygen by his bed if he needs. Nursing home does lab work, unsure of most recent. Resides at [facility] in [place].    • Med Refill     Nursing home does medication refills. Brought list of medication with visit.    • Pacemaker Check     Had SJM PPM checked today, did not do an EKG.        Subjective       Parth Tanner is a 87 y.o. male with HTN, Parkinson's, dementia, edema, and sick sinus syndrome diagnosed in 2011 by Dr. Monsivais and had a pacemaker placed. He also had a stress test and echocardiogram done the same year and reported as normal. In 2015 pacemaker interrogation showed short runs of ventricular tachycardia for which Lopressor was added.  Due to shortness of breath a repeat echo was done in February 2017 that showed normal LV ejection fraction and mild PHT. It appears he went to the hospital in December 2018 for chest pain.  Stress and echo were advised that showed EF of 46% and no ischemic burden without significant valvular concerns. Patient was diagnosed with right sided CAP, treated with ABX therapy. [facility] pacemaker interrogation done 8/29/2019 showed 69% atrial and 99% ventricular pacing.  AMS less than 1%.  Battery life around 3 years.    Today comes to the office for a follow-up visit and pacemaker interrogation.  He resides at [facility] and accompanied by healthcare provider.  Patient states he is doing \"very well\".  Appetite is good.  No chest pain or shortness of breath noted.  Shortness of breath with exertion is the same.  No issues with swelling noted.    HPI     Cardiac History:    Past Surgical History:   Procedure Laterality Date   • APPENDECTOMY     • CARDIOVASCULAR " STRESS TEST  12/09/2011    L.Myoview-() Normal   • CARDIOVASCULAR STRESS TEST  12/17/2018    @[facility]:  EF 46%, no ischemia   • CAROTID ARTERY - SUBCLAVIAN ARTERY BYPASS GRAFT  07/11/2014    Carotid US-() Normal   • ECHO - CONVERTED  12/14/2011    Echo-() EF>65%   • ECHO - CONVERTED  07/22/2015    Echo-EF 60%, AR 3.9, aortic sclerosis with minimal stenosis   • ECHO - CONVERTED  02/15/2017    EF 60-65%, mild MR, mild PHT   • ECHO - CONVERTED  12/17/2018    EF 50-55%, mild MR, mod pulm HTN   • HAND SURGERY Left    • PACEMAKER IMPLANTATION  11/10/2011    St[employer]Koko PPM placed by    • PROSTATE SURGERY  08/2014    Prostate surgery-s/p recently dx with CA (to weak for surgery)-Dr.Ruby moya and .   • TONSILLECTOMY         Current Outpatient Medications   Medication Sig Dispense Refill   • acetaminophen (TYLENOL) 500 MG tablet Take 500 mg by mouth Every 4 (Four) Hours As Needed for Mild Pain .     • aspirin 81 MG EC tablet Take 81 mg by mouth daily.     • carbidopa-levodopa (SINEMET)  MG per tablet Take 1 tablet by mouth 3 (Three) Times a Day.     • Cyanocobalamin (B-12) 1000 MCG/ML kit Inject 1 mL as directed Every 30 (Thirty) Days.     • donepezil (ARICEPT) 10 MG tablet Take 10 mg by mouth every night.     • furosemide (LASIX) 20 MG tablet Take 20 mg by mouth 2 (Two) Times a Day.     • lithium 300 MG tablet Take 300 mg by mouth daily.     • lithium carbonate 300 MG capsule Take 300 mg by mouth 2 (Two) Times a Day With Meals.     • losartan (COZAAR) 25 MG tablet Take 25 mg by mouth Daily.     • metoprolol tartrate (LOPRESSOR) 25 MG tablet Take 1 tablet by mouth Every 12 (Twelve) Hours. 180 tablet 2   • omeprazole (PriLOSEC) 20 MG capsule Take 20 mg by mouth daily.     • oxybutynin (DITROPAN) 5 MG tablet Take 5 mg by mouth 2 (Two) Times a Day.     • polyethylene glycol (MIRALAX) packet Take 17 g by mouth Daily.     • pramipexole (MIRAPEX) 0.25 MG tablet Take 0.25 mg by mouth 2 (two)  "times a day.     • PROMETHAZINE-DM PO Take  by mouth.     • simethicone (MYLICON) 80 MG chewable tablet Chew 80 mg Every 6 (Six) Hours As Needed for Flatulence.       No current facility-administered medications for this visit.        Rocephin [ceftriaxone]    Past Medical History:   Diagnosis Date   • Dementia (CMS/HCC)    • Edema     in lower extremities-bilateral   • History of prostate surgery     in Aug 2014- s/p recently dx with CA (to weak for surgery)- Dr. Haider following and Dr. Lemons.   • History of tonsillectomy    • Hx of appendectomy    • Hydrocele     DX- to have surgery in Oct 2014 regarding ()   • Hypertension     Well controlled by    • Parkinson disease (CMS/[facility])    • SSS (sick sinus syndrome) (CMS/[facility])     PM implant       Social History     Socioeconomic History   • Marital status:      Spouse name: Not on file   • Number of children: Not on file   • Years of education: Not on file   • Highest education level: Not on file   Tobacco Use   • Smoking status: Never Smoker   • Smokeless tobacco: Current User     Types: Chew   • Tobacco comment: 2/2/2016   Substance and Sexual Activity   • Alcohol use: No   • Drug use: No       Family History   Problem Relation Age of Onset   • Other Daughter         one daughter has MVP- and has had MI- at age thirty-three from medication.       Review of Systems   Constitution: Negative for decreased appetite, diaphoresis and fever.   HENT: Negative for congestion, hoarse voice and nosebleeds.    Eyes: Negative for discharge and redness.   Cardiovascular: Positive for palpitations (\"thumps sometimes\"). Negative for chest pain and leg swelling.   Respiratory: Positive for shortness of breath. Negative for cough and sleep disturbances due to breathing (has oxygen to use prn).    Endocrine: Negative for polydipsia, polyphagia and polyuria.   Skin: Negative for dry skin, flushing and itching.   Musculoskeletal: Positive for muscle weakness. " "Negative for falls and muscle cramps.   Gastrointestinal: Negative for dysphagia, melena and nausea.   Genitourinary: Negative for dysuria and hematuria.   Neurological: Positive for light-headedness (at times, not a new problem) and loss of balance (same). Negative for headaches.   Psychiatric/Behavioral: Positive for memory loss. Negative for depression. The patient has insomnia (states lights bother him trying to sleep). The patient is not nervous/anxious.         Objective     BP 98/58 (BP Location: Right arm)   Pulse 68   Ht 177.8 cm (70\")   Wt 78 kg (172 lb)   BMI 24.68 kg/m²     Physical Exam   Constitutional: He is oriented to person, place, and time. He appears well-nourished.   HENT:   Head: Normocephalic.   Eyes: Pupils are equal, round, and reactive to light.   Neck: Normal range of motion. Neck supple.   Cardiovascular: Normal rate, regular rhythm, S1 normal and S2 normal.   No murmur heard.  Pulses:       Radial pulses are 2+ on the right side, and 2+ on the left side.   Pulmonary/Chest: Breath sounds normal. He has no wheezes. He has no rales.   Abdominal: Soft. Bowel sounds are normal. There is no tenderness.   Musculoskeletal: Normal range of motion. He exhibits no edema.   Neurological: He is alert and oriented to person, place, and time.   Skin: Skin is warm and dry. No pallor.   Psychiatric: He has a normal mood and affect. His behavior is normal.        Procedures: pacemaker interrogation done today         Assessment/Plan      Parth was seen today for follow-up, med refill and pacemaker check.    Diagnoses and all orders for this visit:    SSS (sick sinus syndrome) (CMS/HCC)    Presence of cardiac pacemaker    Essential hypertension    Parkinson's disease (CMS/HCC)    Dementia without behavioral disturbance, unspecified dementia type (CMS/HCC)      SSS/pacemaker- interrogation done today and report pending.  Repeat interrogation to be done in 6 months.    Hypertension- well controlled.  " According to healthcare provider blood pressure has been stable when checked at nursing home.  Continue to monitor.  At this time he is on losartan 25 mg daily and Lopressor 25 mg twice daily.  No changes made today.  He is also on daily low-dose aspirin therapy without GI symptoms or excessive bruising noted.    Labs managed through nursing home and PCP.  No lab order given today.    Patient's Body mass index is 24.68 kg/m². BMI is within normal parameters. No follow-up required..  Patient admits to good appetite and has had weight gain of a few pounds.  Continue heart healthy diet.     From a cardiac standpoint patient appears stable today.  No repeat cardiac testing ordered.  A 6-month follow-up visit scheduled.  Please call sooner for cardiac concerns.             Electronically signed by ACACIA Kimball,  February 21, 2020 1:23 PM

## 2020-09-09 ENCOUNTER — TELEPHONE (OUTPATIENT)
Dept: CARDIOLOGY | Facility: CLINIC | Age: 85
End: 2020-09-09

## 2020-09-09 NOTE — TELEPHONE ENCOUNTER
Patient is scheduled for 6 month follow up and [facility] PPM check on 9/16/2020.    Patient's daughter, Valeria, called.  Patient lives in a nursing home in [place].  Valeria asking secondary to COVID is it ok to move appointments out further.  Patient has not been out any since the pandemic.    His last check was 2/19/2020 (estimated battery life 2.3 years)            I was given verbal permission from patient's daughter, Elvie Noel, to speak with her sister Valeria.

## 2020-09-09 NOTE — TELEPHONE ENCOUNTER
Valeria aware of rescheduled appointment/[facility] [facility] PPM check, 12/2/2020 at 12:00 pm DAKOTA Shaw states she will notify nursing home of new appointment.

## 2021-10-05 NOTE — PROGRESS NOTES
Chief Complaint   Patient presents with   • Follow-up     Last SJM PPM checked 3/15/17. He reports having some indigestion, he states doesn't have as often but when he has the indigestion it hurts more than it did.   • Dizziness     He states about the same. He states unsteadiness has gotten little worse. Wife reports that he works all the time.       Denver Tanner is a 85 y.o. male with a history of sick sinus syndrome diagnosed in 2011 by Dr. Monsivais and had a pacemaker placed. He also had a stress test and echocardiogram done the same year and reported as normal. In 2015 pacemaker interrogation showed short runs of ventricular tachycardia for which Lopressor was added.  Most recent pacer check in March 2017 showed 99% ventricular pacing and no mode switches. Due to shortness of breath a repeat echo was done in February 2017 that showed normal LV ejection fraction and mild PHT. He continues to be managed medically.   Today he comes to the office for a follow up appointment and no cardiac concerns are voiced. His wife states he stays active. She is concerned he does not want to take his medications some days.     HPI         Cardiac History:    Past Surgical History:   Procedure Laterality Date   • APPENDECTOMY     • CARDIOVASCULAR STRESS TEST  12/09/2011    L.Myoview-() Normal   • CAROTID ARTERY - SUBCLAVIAN ARTERY BYPASS GRAFT  07/11/2014    Carotid US-() Normal   • ECHO - CONVERTED  12/14/2011    Echo-() EF>65%   • ECHO - CONVERTED  07/22/2015    Echo-EF 60%, AR 3.9, aortic sclerosis with minimal stenosis   • ECHO - CONVERTED  02/15/2017    EF 60-65%, mild MR, mild PHT   • HAND SURGERY Left    • PACEMAKER IMPLANTATION  11/10/2011    [employer] PPM placed by    • PROSTATE SURGERY  08/2014    Prostate surgery-s/p recently dx with CA (to weak for surgery)-Dr.Ruby moya and .   • TONSILLECTOMY         Current Outpatient Prescriptions   Medication Sig Dispense  Refill   • aspirin 81 MG EC tablet Take 81 mg by mouth daily.     • donepezil (ARICEPT) 10 MG tablet Take 10 mg by mouth every night.     • furosemide (LASIX) 20 MG tablet Take 20 mg by mouth daily as needed.     • lithium 300 MG tablet Take 300 mg by mouth daily.     • metoprolol tartrate (LOPRESSOR) 25 MG tablet TAKE ONE TABLET BY MOUTH TWICE A DAY 60 tablet 1   • omeprazole (PriLOSEC) 20 MG capsule Take 20 mg by mouth daily.     • oxybutynin (DITROPAN) 5 MG tablet Take 5 mg by mouth 2 (Two) Times a Day.     • pramipexole (MIRAPEX) 0.25 MG tablet Take 0.25 mg by mouth 2 (two) times a day.       No current facility-administered medications for this visit.        Rocephin [ceftriaxone]    Past Medical History:   Diagnosis Date   • Dementia    • Edema     in lower extremities-bilateral   • History of prostate surgery     in Aug 2014- s/p recently dx with CA (to weak for surgery)- Dr. Haider following and Dr. Lemons.   • History of tonsillectomy    • Hx of appendectomy    • Hydrocele     DX- to have surgery in Oct 2014 regarding ()   • Hypertension     Well controlled by    • Parkinson disease    • SSS (sick sinus syndrome)     PM implant       Social History     Social History   • Marital status:      Spouse name: N/A   • Number of children: N/A   • Years of education: N/A     Occupational History   • Not on file.     Social History Main Topics   • Smoking status: Never Smoker   • Smokeless tobacco: Current User     Types: Chew      Comment: 2/2/2016   • Alcohol use No   • Drug use: No   • Sexual activity: Not on file     Other Topics Concern   • Not on file     Social History Narrative       Family History   Problem Relation Age of Onset   • Other Daughter      one daughter has MVP- and has had MI- at age thirty-three from medication.       Review of Systems   Constitution: Positive for malaise/fatigue (same). Negative for decreased appetite.   HENT: Negative for congestion, nosebleeds and  "sore throat.    Eyes: Negative for blurred vision.   Cardiovascular: Positive for leg swelling (mild). Negative for chest pain, dyspnea on exertion, near-syncope and palpitations.   Respiratory: Negative for shortness of breath and snoring.    Endocrine: Negative for cold intolerance and heat intolerance.   Hematologic/Lymphatic: Negative for adenopathy. Does not bruise/bleed easily.   Skin: Negative for itching, nail changes and skin cancer.   Musculoskeletal: Positive for arthritis and back pain.   Gastrointestinal: Positive for heartburn (with certain foods). Negative for abdominal pain, dysphagia, melena and nausea.   Genitourinary: Negative for dysuria and hematuria.   Neurological: Negative for dizziness and light-headedness. Loss of balance: at times.   Psychiatric/Behavioral: Negative for altered mental status. The patient has insomnia.    Allergic/Immunologic: Negative for environmental allergies and hives.        Diabetes- No  Thyroid-normal    Objective     /60 (BP Location: Right arm)  Pulse 60  Ht 70\" (177.8 cm)  Wt 173 lb (78.5 kg)  BMI 24.82 kg/m2    Physical Exam   Constitutional: He is oriented to person, place, and time. He appears well-nourished.   HENT:   Head: Normocephalic.   Eyes: Conjunctivae are normal. Pupils are equal, round, and reactive to light.   Neck: No JVD present. Carotid bruit is not present. No thyromegaly present.   Cardiovascular: Normal rate, regular rhythm, S1 normal and S2 normal.    Murmur heard.   Systolic murmur is present with a grade of 2/6   Slightly loud S2   Pulmonary/Chest: Effort normal and breath sounds normal. He has no wheezes. He has no rales.   Abdominal: Soft. Bowel sounds are normal. He exhibits no distension. There is no tenderness.   Musculoskeletal: Normal range of motion. He exhibits edema (1+ lower legs).   Neurological: He is alert and oriented to person, place, and time.   Skin: Skin is warm and dry. No rash noted. No pallor.   Psychiatric: " No He has a normal mood and affect. His behavior is normal.   Vitals reviewed.      ECG 12 Lead  Date/Time: 8/14/2017 10:47 AM  Performed by: MARK NORMAN  Authorized by: MARK NORMAN   Comparison: compared with previous ECG from 2/9/2017  Comparison to previous ECG: Atrial and ventricular pacing  Rhythm: paced  Rate: normal  BPM: 61                  Assessment/Plan      Parth was seen today for follow-up and dizziness.    Diagnoses and all orders for this visit:    SSS (sick sinus syndrome)    Essential hypertension    Dementia without behavioral disturbance, unspecified dementia type    Parkinson's disease    Chewing tobacco use    Other orders  -     ECG 12 Lead      EKG today shows atrial and ventricular pacing. His blood pressure is stable. No medication changes made. I instructed him on need to take medications daily as prescribed. I also encouraged him to make appointment for follow up visit with you for reassessment of labs and evaluation of any need for GI symptom of heart burn as well as reported insomnia. A pacemaker interrogation scheduled for next month. The report of his most recent echo was reviewed. No further cardiac workup warranted at this time.              Electronically signed by ACACIA Kimball,  August 14, 2017 11:16 AM